# Patient Record
Sex: MALE | Race: WHITE | NOT HISPANIC OR LATINO | Employment: FULL TIME | RURAL
[De-identification: names, ages, dates, MRNs, and addresses within clinical notes are randomized per-mention and may not be internally consistent; named-entity substitution may affect disease eponyms.]

---

## 2022-10-26 DIAGNOSIS — L98.9 SKIN LESION: Primary | ICD-10-CM

## 2022-11-08 ENCOUNTER — OFFICE VISIT (OUTPATIENT)
Dept: DERMATOLOGY | Facility: CLINIC | Age: 62
End: 2022-11-08
Payer: COMMERCIAL

## 2022-11-08 VITALS — HEIGHT: 73 IN | RESPIRATION RATE: 18 BRPM

## 2022-11-08 DIAGNOSIS — D48.9 NEOPLASM OF UNCERTAIN BEHAVIOR: Primary | ICD-10-CM

## 2022-11-08 DIAGNOSIS — Z85.828 HISTORY OF SKIN CANCER: ICD-10-CM

## 2022-11-08 DIAGNOSIS — L57.0 ACTINIC KERATOSES: ICD-10-CM

## 2022-11-08 PROCEDURE — 17000 PR DESTRUCTION(LASER SURGERY,CRYOSURGERY,CHEMOSURGERY),PREMALIGNANT LESIONS,FIRST LESION: ICD-10-PCS | Mod: XS,ICN,, | Performed by: STUDENT IN AN ORGANIZED HEALTH CARE EDUCATION/TRAINING PROGRAM

## 2022-11-08 PROCEDURE — 17003 DESTRUCT PREMALG LES 2-14: CPT | Mod: ICN,,, | Performed by: STUDENT IN AN ORGANIZED HEALTH CARE EDUCATION/TRAINING PROGRAM

## 2022-11-08 PROCEDURE — 17003 DESTRUCTION, PREMALIGNANT LESIONS; SECOND THROUGH 14 LESIONS: ICD-10-PCS | Mod: ICN,,, | Performed by: STUDENT IN AN ORGANIZED HEALTH CARE EDUCATION/TRAINING PROGRAM

## 2022-11-08 PROCEDURE — 40490 PR BIOPSY OF LIP: ICD-10-PCS | Mod: XS,ICN,, | Performed by: STUDENT IN AN ORGANIZED HEALTH CARE EDUCATION/TRAINING PROGRAM

## 2022-11-08 PROCEDURE — 17000 DESTRUCT PREMALG LESION: CPT | Mod: XS,ICN,, | Performed by: STUDENT IN AN ORGANIZED HEALTH CARE EDUCATION/TRAINING PROGRAM

## 2022-11-08 PROCEDURE — 1159F PR MEDICATION LIST DOCUMENTED IN MEDICAL RECORD: ICD-10-PCS | Mod: ICN,,, | Performed by: STUDENT IN AN ORGANIZED HEALTH CARE EDUCATION/TRAINING PROGRAM

## 2022-11-08 PROCEDURE — 88305 TISSUE EXAM BY PATHOLOGIST: CPT | Mod: 26,,, | Performed by: PATHOLOGY

## 2022-11-08 PROCEDURE — 99203 OFFICE O/P NEW LOW 30 MIN: CPT | Mod: 25,ICN,, | Performed by: STUDENT IN AN ORGANIZED HEALTH CARE EDUCATION/TRAINING PROGRAM

## 2022-11-08 PROCEDURE — 11103 PR TANGENTIAL BIOPSY, SKIN, EA ADDTL LESION: ICD-10-PCS | Mod: ICN,,, | Performed by: STUDENT IN AN ORGANIZED HEALTH CARE EDUCATION/TRAINING PROGRAM

## 2022-11-08 PROCEDURE — 88305 TISSUE EXAM BY PATHOLOGIST: CPT | Mod: SUR | Performed by: STUDENT IN AN ORGANIZED HEALTH CARE EDUCATION/TRAINING PROGRAM

## 2022-11-08 PROCEDURE — 11102 PR TANGENTIAL BIOPSY, SKIN, SINGLE LESION: ICD-10-PCS | Mod: XS,ICN,, | Performed by: STUDENT IN AN ORGANIZED HEALTH CARE EDUCATION/TRAINING PROGRAM

## 2022-11-08 PROCEDURE — 40490 BIOPSY OF LIP: CPT | Mod: XS,ICN,, | Performed by: STUDENT IN AN ORGANIZED HEALTH CARE EDUCATION/TRAINING PROGRAM

## 2022-11-08 PROCEDURE — 99203 PR OFFICE/OUTPT VISIT, NEW, LEVL III, 30-44 MIN: ICD-10-PCS | Mod: 25,ICN,, | Performed by: STUDENT IN AN ORGANIZED HEALTH CARE EDUCATION/TRAINING PROGRAM

## 2022-11-08 PROCEDURE — 11102 TANGNTL BX SKIN SINGLE LES: CPT | Mod: XS,ICN,, | Performed by: STUDENT IN AN ORGANIZED HEALTH CARE EDUCATION/TRAINING PROGRAM

## 2022-11-08 PROCEDURE — 1159F MED LIST DOCD IN RCRD: CPT | Mod: ICN,,, | Performed by: STUDENT IN AN ORGANIZED HEALTH CARE EDUCATION/TRAINING PROGRAM

## 2022-11-08 PROCEDURE — 11103 TANGNTL BX SKIN EA SEP/ADDL: CPT | Mod: ICN,,, | Performed by: STUDENT IN AN ORGANIZED HEALTH CARE EDUCATION/TRAINING PROGRAM

## 2022-11-08 PROCEDURE — 88305 PATHOLOGY, DERMATOLOGY: ICD-10-PCS | Mod: 26,,, | Performed by: PATHOLOGY

## 2022-11-08 RX ORDER — POTASSIUM CHLORIDE 20 MEQ/1
20 TABLET, EXTENDED RELEASE ORAL DAILY
COMMUNITY
Start: 2022-09-29

## 2022-11-08 RX ORDER — PANTOPRAZOLE SODIUM 40 MG/1
40 TABLET, DELAYED RELEASE ORAL DAILY
COMMUNITY
Start: 2022-11-02

## 2022-11-08 RX ORDER — RANOLAZINE 1000 MG/1
1000 TABLET, EXTENDED RELEASE ORAL 2 TIMES DAILY
COMMUNITY
Start: 2022-05-27

## 2022-11-08 RX ORDER — AMOXICILLIN 500 MG
CAPSULE ORAL DAILY
COMMUNITY

## 2022-11-08 RX ORDER — ASPIRIN 81 MG/1
81 TABLET ORAL DAILY
COMMUNITY

## 2022-11-08 RX ORDER — ISOSORBIDE MONONITRATE 30 MG/1
30 TABLET, EXTENDED RELEASE ORAL DAILY
COMMUNITY
Start: 2022-08-03

## 2022-11-08 RX ORDER — CLOPIDOGREL BISULFATE 75 MG/1
75 TABLET ORAL DAILY
COMMUNITY
Start: 2022-09-12

## 2022-11-08 RX ORDER — METOPROLOL TARTRATE 25 MG/1
25 TABLET, FILM COATED ORAL DAILY
COMMUNITY
Start: 2022-11-02

## 2022-11-08 RX ORDER — EZETIMIBE 10 MG/1
10 TABLET ORAL DAILY
COMMUNITY
Start: 2022-10-18

## 2022-11-08 NOTE — PROGRESS NOTES
[facility]  Ghanshyam Egan MD    [address] [address]  [phone]    Fax: [phone]    Patient Name: Robert Serrano  Medical Record Number: [id number]  PCP: Nic Navarro MD  Age: 62 y.o. : 1960  Contact: [phone] (home)     CC: lesion on lip  History of Present Illness:     Robert Serrano is a 62 y.o.  male with history of skin cancer (treated by Dr. Garcia on arms and face)  who presents to clinic today for lesion on lip.  This has been present for 5 months. Symptoms include growth. Previous treatments include C-5 FU/ Lidocaine lip BA 1/1% and carmex. Other concerns today are lesion on left arm.      The patient has no other concerns today.    Review of Systems:     Unremarkable other than mentioned above.     Physical Exam:     General: Relaxed, oriented, alert    Skin examination of the scalp, face, neck, chest, back, abdomen, upper extremities and lower extremities were normal except for as listed below          Assessment and Plan:     1. Actinic Keratoses  Erythematous, scaly papules on right ear, right arm     Plan: Liquid Nitrogen.  A total of 3 lesions were treated with liquid nitrogen for 2 freeze-thaw cycles lasting 5 seconds, located on the above locations.   The patient's consent was obtained including but not limited to risks of crusting, scabbing,  blistering, scarring, darker or lighter pigmentary change, recurrence, incomplete removal and infection.    Counseling.  Sun protective clothing and broad spectrum sunscreen can prevent the formation of AK.   AKs can be treated with cryotherapy, photodynamic therapy, imiquimod, topical 5-FU.  Actinic Keratoses are precancerous proliferations that occur within sun damaged skin. If untreated,  a small subset of AKs can develop into Squamous Cell Carcinoma.       2. Neoplasm of Uncertain Behavior x 3     A) scaly bleeding plaque located on the left arm  Ddx includes: BCC  vs SCC    B) ulcerated plaque located on the lower lip  Ddx includes: SCC    C) scaly plaque located on left temple  Ddx includes: SCC vs AK      Shave biopsy  x 3    Pre-procedure Diagnosis: as above  Post_procedure Diagnosis: same  Estimated Blood Loss: <1cc    Findings: None  Complications: None  Specimens: to pathology      Written informed consent was obtained after discussing risks including pain, bleeding, infection, recurrence and scarring. The biopsy site was sterilely prepped with alcohol, which was allowed to dry completely, then locally infiltrated with 1% lidocaine with epinephrine, ~3 cc total. A shave biopsy was obtained using a Dermablade/15 and the specimen was sent to dermatopathology. Aluminum chloride was used for hemostasis. Antibiotic ointment and a clean dressing were applied. The patient tolerated the procedure well without complications. Verbal and written wound care instructions were given.    Ghanshyam Egan MD       Return to clinic in 6 months    AVS printed with patient instructions     Ghanshyam Egan MD   Mohs Surgery/Dermatologic Oncology  Dermatology

## 2022-11-08 NOTE — PATIENT INSTRUCTIONS
"Biopsy Site Care  Starting tomorrow you may shower and wash the area with antibacterial soap  Pat dry and apply vaseline and a bandaid  The area will be irritated, sore, slightly red, and may itch, sting, or burn  Do not apply make-up to the area until it is healed  There will be a scar anytime we cut the skin to the level of the dermis, which occurs in a biopsy   The area will take 1-2 weeks to heal  No soaking in the tub or hot tub for one week    Liquid Nitrogen Wound Care     - the areas treated with liquid nitrogen may form sores, blisters, and scabs. This is normal   - if a blister forms, please keep it intact and do not rupture it. It will resolve within a few days   - please keep petrolatum ointment to the area once to twice daily. You can cover with a bandage if you wish, but it is usually not necessary   - the area may be painful for a few days. We recommend ice, or over the counter tylenol or NSAIDs (such as ibuprofen or naproxen, if you are able to take these)   - this may result in a scar or a "hypopigmented" or lighter area of skin. This may or may not resolve with time   - please call our clinic if the lesion does not resolve, as this can be treated again     "

## 2022-11-10 LAB
ESTROGEN SERPL-MCNC: NORMAL PG/ML
INSULIN SERPL-ACNC: NORMAL U[IU]/ML
LAB AP GROSS DESCRIPTION: NORMAL
LAB AP LABORATORY NOTES: NORMAL
LAB AP SPEC A DDX: NORMAL
LAB AP SPEC A MORPHOLOGY: NORMAL
LAB AP SPEC A PROCEDURE: NORMAL
LAB AP SPEC B DDX: NORMAL
LAB AP SPEC B MORPHOLOGY: NORMAL
LAB AP SPEC B PROCEDURE: NORMAL
LAB AP SPEC C DDX: NORMAL
LAB AP SPEC C MORPHOLOGY: NORMAL
LAB AP SPEC C PROCEDURE: NORMAL
T3RU NFR SERPL: NORMAL %

## 2022-12-14 NOTE — PATIENT INSTRUCTIONS

## 2022-12-15 ENCOUNTER — PROCEDURE VISIT (OUTPATIENT)
Dept: DERMATOLOGY | Facility: CLINIC | Age: 62
End: 2022-12-15
Payer: COMMERCIAL

## 2022-12-15 VITALS — HEART RATE: 70 BPM | SYSTOLIC BLOOD PRESSURE: 130 MMHG | DIASTOLIC BLOOD PRESSURE: 72 MMHG

## 2022-12-15 DIAGNOSIS — D04.62 SQUAMOUS CELL CARCINOMA IN SITU (SCCIS) OF SKIN OF LEFT UPPER ARM: Primary | ICD-10-CM

## 2022-12-15 PROCEDURE — 88305 TISSUE EXAM BY PATHOLOGIST: CPT | Mod: TC,SUR | Performed by: STUDENT IN AN ORGANIZED HEALTH CARE EDUCATION/TRAINING PROGRAM

## 2022-12-15 PROCEDURE — 11604 EXC TR-EXT MAL+MARG 3.1-4 CM: CPT | Mod: ,,, | Performed by: STUDENT IN AN ORGANIZED HEALTH CARE EDUCATION/TRAINING PROGRAM

## 2022-12-15 PROCEDURE — 88305 PATHOLOGY, DERMATOLOGY: ICD-10-PCS | Mod: 26,,, | Performed by: PATHOLOGY

## 2022-12-15 PROCEDURE — 99499 UNLISTED E&M SERVICE: CPT | Mod: ,,, | Performed by: STUDENT IN AN ORGANIZED HEALTH CARE EDUCATION/TRAINING PROGRAM

## 2022-12-15 PROCEDURE — 12034 PR LAYR CLOS WND TRUNK,ARM,LEG 7.6-12.5 CM: ICD-10-PCS | Mod: 51,,, | Performed by: STUDENT IN AN ORGANIZED HEALTH CARE EDUCATION/TRAINING PROGRAM

## 2022-12-15 PROCEDURE — 11604 PR EXC SKIN MALIG 3.1-4 CM TRUNK,ARM,LEG: ICD-10-PCS | Mod: ,,, | Performed by: STUDENT IN AN ORGANIZED HEALTH CARE EDUCATION/TRAINING PROGRAM

## 2022-12-15 PROCEDURE — 99499 NO LOS: ICD-10-PCS | Mod: ,,, | Performed by: STUDENT IN AN ORGANIZED HEALTH CARE EDUCATION/TRAINING PROGRAM

## 2022-12-15 PROCEDURE — 88305 TISSUE EXAM BY PATHOLOGIST: CPT | Mod: 26,,, | Performed by: PATHOLOGY

## 2022-12-15 PROCEDURE — 12034 INTMD RPR S/TR/EXT 7.6-12.5: CPT | Mod: 51,,, | Performed by: STUDENT IN AN ORGANIZED HEALTH CARE EDUCATION/TRAINING PROGRAM

## 2022-12-15 RX ORDER — MUPIROCIN 20 MG/G
OINTMENT TOPICAL 2 TIMES DAILY
Qty: 22 G | Refills: 5 | Status: SHIPPED | OUTPATIENT
Start: 2022-12-15

## 2022-12-15 NOTE — PROGRESS NOTES
[facility]    Ghanshyam Egan MD    Elliptical Excision with Intermediate Closure    Tumor Type: SCCIS  Location:  left arm  Derm-Path Accession #:  [id number]  Lesion Size:  3.1 x 2.1 cm   Surgical Margins: 0.5 cm   Post op size: 4.1 x 3.1 cm  Level of Defect:  fat  Repair Type:  Intermediate linear   Repair Length:  8 cm   Sutures: 4-0 prolene, 3-0 PDS    Primary Surgeon: CURRY Egan MD      INDICATIONS:  The risks of bleeding, infection, discomfort, incomplete removal, and scar formation were explained to the patient.  All questions were answered.  After informed consent, confirmation of site and identity, and appropriate instructions, the patient underwent the procedure as follows:    PROCEDURE:  With the patient in a supine position, the lesion was outlined with the above margins. An ellipse was designed around the lesion to conform to relaxed skin tension lines in an effort to minimize scarring and deformity.  The patient was then placed in a supine position.  The lesion and surrounding skin were prepped with chlorhexidine, draped, and anesthetized with 1% lidocaine with epinephrine 1:100,100 buffered with 1:10 sodium bicarbonate.  Using a #15 blade, the skin was excised along premarked lines.  The resulting defect extended through deep subcutaneous tissue.  Wound margins were undermined to limit functional deformity/impairment of adjacent structures.  Bleeding vessels were controlled with  monopolar  electrodessication .  The dermis and subcutaneous tissue were closed with buried vertical mattress sutures.  Epidermal approximation was meticulously refined with simple running sutures, resulting in a linear closure with little to no wound tension.  Blood loss was estimated to be less than 5cc.  The area was coated with petrolatum and covered with a non-adherent dressing followed by gauze and tape.  Postoperative instructions were reviewed per protocol.  The patient left alert and fully  oriented.              Ghanshyam Egan MD

## 2022-12-15 NOTE — PROGRESS NOTES
Excision Consult Note    Robert Serrano is a 62 y.o. male who is referred by Dr. Egan for evaluation of a SCCIS on the left arm.     Recurrent skin cancer: No    Preoperative Risk Factors:  Current Anticoagulants: Yes asa 81 mg plavix 75 mg   Endocarditis / Rheumatic Fever hx: No  Immunocompromised: No  Prosthetic joint: No  Congenital heart defect: No  Prosthetic heart valve: No  Diabetic: No  Transplant: No  Pacemaker: No  Defibrillator:  No  Prior problem with local anesthesia: No  Tobacco History: No]  Clindamycin Allergy: No  Pregnant: no     Transmissible Diseases:  HIV No  Hepatitis B or C  No      Exam:  Limited skin exam is normal except for a SCCIS  located on the left arm  .    Pathologic Findings:  Accession # [id number]  Diagnosis: SCCIS    Assessment and Plan:  Treatment Options : Given the indications and high cure rate, the patient has agreed to proceed with excision  Risks and Benefits : The rationale for excision was explained to the patient. The risks and benefits to therapy were discussed in detail. Specifically, the risks of infection, scarring, bleeding, dehiscence, hematoma, prolonged wound healing, incomplete removal, allergy to anesthesia, nerve injury, inability to clear the lesion and recurrence were addressed. The treatment site was clearly identified and confirmed by the patient.    Plan:  Excision    Ghanshyam Egan MD   Mohs Surgery/Dermatologic Oncology

## 2022-12-19 LAB
ESTROGEN SERPL-MCNC: NORMAL PG/ML
INSULIN SERPL-ACNC: NORMAL U[IU]/ML
LAB AP GROSS DESCRIPTION: NORMAL
LAB AP LABORATORY NOTES: NORMAL
LAB AP SPEC A DDX: NORMAL
LAB AP SPEC A MORPHOLOGY: NORMAL
LAB AP SPEC A PROCEDURE: NORMAL
T3RU NFR SERPL: NORMAL %

## 2022-12-27 ENCOUNTER — CLINICAL SUPPORT (OUTPATIENT)
Dept: DERMATOLOGY | Facility: CLINIC | Age: 62
End: 2022-12-27
Payer: COMMERCIAL

## 2022-12-27 DIAGNOSIS — Z85.828 HISTORY OF SKIN CANCER: ICD-10-CM

## 2022-12-27 DIAGNOSIS — L08.9 SKIN INFECTION: ICD-10-CM

## 2022-12-27 DIAGNOSIS — L57.0 ACTINIC KERATOSES: Primary | ICD-10-CM

## 2022-12-27 DIAGNOSIS — B00.1 COLD SORE: ICD-10-CM

## 2022-12-27 PROCEDURE — 87077 CULTURE AEROBIC IDENTIFY: CPT | Mod: ,,, | Performed by: CLINICAL MEDICAL LABORATORY

## 2022-12-27 PROCEDURE — 17000 DESTRUCT PREMALG LESION: CPT | Mod: ,,, | Performed by: STUDENT IN AN ORGANIZED HEALTH CARE EDUCATION/TRAINING PROGRAM

## 2022-12-27 PROCEDURE — 87077 CULTURE, WOUND: ICD-10-PCS | Mod: ,,, | Performed by: CLINICAL MEDICAL LABORATORY

## 2022-12-27 PROCEDURE — 87070 CULTURE OTHR SPECIMN AEROBIC: CPT | Mod: ,,, | Performed by: CLINICAL MEDICAL LABORATORY

## 2022-12-27 PROCEDURE — 17000 PR DESTRUCTION(LASER SURGERY,CRYOSURGERY,CHEMOSURGERY),PREMALIGNANT LESIONS,FIRST LESION: ICD-10-PCS | Mod: ,,, | Performed by: STUDENT IN AN ORGANIZED HEALTH CARE EDUCATION/TRAINING PROGRAM

## 2022-12-27 PROCEDURE — 99214 PR OFFICE/OUTPT VISIT, EST, LEVL IV, 30-39 MIN: ICD-10-PCS | Mod: 25,,, | Performed by: STUDENT IN AN ORGANIZED HEALTH CARE EDUCATION/TRAINING PROGRAM

## 2022-12-27 PROCEDURE — 87070 CULTURE, WOUND: ICD-10-PCS | Mod: ,,, | Performed by: CLINICAL MEDICAL LABORATORY

## 2022-12-27 PROCEDURE — 99214 OFFICE O/P EST MOD 30 MIN: CPT | Mod: 25,,, | Performed by: STUDENT IN AN ORGANIZED HEALTH CARE EDUCATION/TRAINING PROGRAM

## 2022-12-27 PROCEDURE — 87186 SC STD MICRODIL/AGAR DIL: CPT | Mod: ,,, | Performed by: CLINICAL MEDICAL LABORATORY

## 2022-12-27 PROCEDURE — 87186 CULTURE, WOUND: ICD-10-PCS | Mod: ,,, | Performed by: CLINICAL MEDICAL LABORATORY

## 2022-12-27 RX ORDER — VALACYCLOVIR HYDROCHLORIDE 1 G/1
2000 TABLET, FILM COATED ORAL 2 TIMES DAILY
Qty: 4 TABLET | Refills: 2 | Status: SHIPPED | OUTPATIENT
Start: 2022-12-27 | End: 2022-12-28

## 2022-12-27 RX ORDER — DOXYCYCLINE 100 MG/1
100 CAPSULE ORAL 2 TIMES DAILY
Qty: 14 CAPSULE | Refills: 0 | Status: CANCELLED | OUTPATIENT
Start: 2022-12-27 | End: 2023-01-03

## 2022-12-27 RX ORDER — VALACYCLOVIR HYDROCHLORIDE 500 MG/1
500 TABLET, FILM COATED ORAL DAILY
Qty: 90 TABLET | Refills: 11 | Status: SHIPPED | OUTPATIENT
Start: 2022-12-27 | End: 2023-12-27

## 2022-12-27 NOTE — PROGRESS NOTES
Elliptical Excision with Intermediate Closure     Tumor Type: SCCIS  Location:  left arm  Derm-Path Accession #:  [id number]  Lesion Size:  3.1 x 2.1 cm   Surgical Margins: 0.5 cm   Post op size: 4.1 x 3.1 cm  Level of Defect:  fat  Repair Type:  Intermediate linear   Repair Length:  8 cm   Sutures: 4-0 prolene, 3-0 PDS     Primary Surgeon: CURRY Egan MD    Sutures removed. Incision draining slightly, culture obtained. Patient did not apply the mupirocin he had to the wound as directed     He also reports recurrent cold sores, several a month. He also has a tender growing lesion on L forearm

## 2022-12-27 NOTE — PROGRESS NOTES
[facility]  Ghanshyam Egan MD    [address] [place] [address]  [phone]    Fax: [phone]    Patient Name: Robert Serrano  Medical Record Number: [id number]  PCP: Nic Navarro MD  Age: 62 y.o. : 1960  Contact: [phone] (home)     History of Present Illness:     Robert Serrano is a 62 y.o.  male here for follow up of SCC of L forearm s/p excision. He also reports recurrent cold sores, several a month. He also has a tender growing lesion on L forearm     The patient has no other concerns today.    Review of Systems:     Unremarkable other than mentioned above.     Physical Exam:     General: Relaxed, oriented, alert    Skin examination of the scalp, face, neck, chest, back, abdomen, upper extremities and lower extremities were normal except for as listed below      Assessment and Plan:     Recurrent cold sores (herpes labialis)  - erosions on vermillion border     - Valtrex 2 g q12h x 1 day PRN at first sign of cold sore  - will do valtrex 500 mg daily for suppressive therapty    2. Actinic Keratoses  Erythematous, scaly papules on L forearm    Plan: Liquid Nitrogen.  A total of 1 lesions were treated with liquid nitrogen for 2 freeze-thaw cycles lasting 5 seconds, located on the above locations.   The patient's consent was obtained including but not limited to risks of crusting, scabbing,  blistering, scarring, darker or lighter pigmentary change, recurrence, incomplete removal and infection.    Counseling.  Sun protective clothing and broad spectrum sunscreen can prevent the formation of AK.   AKs can be treated with cryotherapy, photodynamic therapy, imiquimod, topical 5-FU.  Actinic Keratoses are precancerous proliferations that occur within sun damaged skin. If untreated,  a small subset of AKs can develop into Squamous Cell Carcinoma.    3. History of NMSC   Well-healingscar on L forearm   No e/o recurrence   Recommend sunscreen  and good photoprotection     Ghanshyam Egan MD   Mohs Surgery/Dermatologic Oncology    Return to clinic in May 2023     AVS printed with patient instructions     Ghanshyam Egan MD   Mohs Surgery/Dermatologic Oncology  Dermatology

## 2022-12-29 LAB — MICROORGANISM SPEC CULT: ABNORMAL

## 2023-05-09 ENCOUNTER — OFFICE VISIT (OUTPATIENT)
Dept: DERMATOLOGY | Facility: CLINIC | Age: 63
End: 2023-05-09
Payer: COMMERCIAL

## 2023-05-09 DIAGNOSIS — Z85.828 HISTORY OF NONMELANOMA SKIN CANCER: ICD-10-CM

## 2023-05-09 DIAGNOSIS — D48.9 NEOPLASM OF UNCERTAIN BEHAVIOR: Primary | ICD-10-CM

## 2023-05-09 DIAGNOSIS — L57.0 ACTINIC KERATOSES: ICD-10-CM

## 2023-05-09 PROCEDURE — 17000 PR DESTRUCTION(LASER SURGERY,CRYOSURGERY,CHEMOSURGERY),PREMALIGNANT LESIONS,FIRST LESION: ICD-10-PCS | Mod: ,,, | Performed by: STUDENT IN AN ORGANIZED HEALTH CARE EDUCATION/TRAINING PROGRAM

## 2023-05-09 PROCEDURE — 88342 IMHCHEM/IMCYTCHM 1ST ANTB: CPT | Mod: 26,,, | Performed by: PATHOLOGY

## 2023-05-09 PROCEDURE — 17003 DESTRUCT PREMALG LES 2-14: CPT | Mod: ,,, | Performed by: STUDENT IN AN ORGANIZED HEALTH CARE EDUCATION/TRAINING PROGRAM

## 2023-05-09 PROCEDURE — 88312 PATHOLOGY, DERMATOLOGY: ICD-10-PCS | Mod: 26,,, | Performed by: PATHOLOGY

## 2023-05-09 PROCEDURE — 40490 BIOPSY OF LIP: CPT | Mod: XS,,, | Performed by: STUDENT IN AN ORGANIZED HEALTH CARE EDUCATION/TRAINING PROGRAM

## 2023-05-09 PROCEDURE — 17000 DESTRUCT PREMALG LESION: CPT | Mod: ,,, | Performed by: STUDENT IN AN ORGANIZED HEALTH CARE EDUCATION/TRAINING PROGRAM

## 2023-05-09 PROCEDURE — 88312 SPECIAL STAINS GROUP 1: CPT | Mod: 26,,, | Performed by: PATHOLOGY

## 2023-05-09 PROCEDURE — 88305 TISSUE EXAM BY PATHOLOGIST: CPT | Mod: 26,,, | Performed by: PATHOLOGY

## 2023-05-09 PROCEDURE — 99213 PR OFFICE/OUTPT VISIT, EST, LEVL III, 20-29 MIN: ICD-10-PCS | Mod: 25,,, | Performed by: STUDENT IN AN ORGANIZED HEALTH CARE EDUCATION/TRAINING PROGRAM

## 2023-05-09 PROCEDURE — 40490 PR BIOPSY OF LIP: ICD-10-PCS | Mod: XS,,, | Performed by: STUDENT IN AN ORGANIZED HEALTH CARE EDUCATION/TRAINING PROGRAM

## 2023-05-09 PROCEDURE — 88305 TISSUE EXAM BY PATHOLOGIST: CPT | Mod: TC,SUR | Performed by: STUDENT IN AN ORGANIZED HEALTH CARE EDUCATION/TRAINING PROGRAM

## 2023-05-09 PROCEDURE — 88305 PATHOLOGY, DERMATOLOGY: ICD-10-PCS | Mod: 26,,, | Performed by: PATHOLOGY

## 2023-05-09 PROCEDURE — 99213 OFFICE O/P EST LOW 20 MIN: CPT | Mod: 25,,, | Performed by: STUDENT IN AN ORGANIZED HEALTH CARE EDUCATION/TRAINING PROGRAM

## 2023-05-09 PROCEDURE — 88342 IMHCHEM/IMCYTCHM 1ST ANTB: CPT

## 2023-05-09 PROCEDURE — 17003 DESTRUCTION, PREMALIGNANT LESIONS; SECOND THROUGH 14 LESIONS: ICD-10-PCS | Mod: ,,, | Performed by: STUDENT IN AN ORGANIZED HEALTH CARE EDUCATION/TRAINING PROGRAM

## 2023-05-09 PROCEDURE — 88342 PATHOLOGY, DERMATOLOGY: ICD-10-PCS | Mod: 26,,, | Performed by: PATHOLOGY

## 2023-05-09 NOTE — PROGRESS NOTES
[facility]  Ghanshyam Egan MD    [facility] [address] [place] [address]  [phone]    Fax: [phone]    Patient Name: Robert Serrano  Medical Record Number: [id number]  PCP: Nic Navarro MD  Age: 63 y.o. : 1960  Contact: [phone] (home)     History of Present Illness:     Robert Serrano is a 63 y.o.  male here for follow up of SCCIS of L arm. S/P excision with clear margins on 2022. The scar has healed well. Today, he is here for routine skin exam givne hx of NMSC    The patient has no other concerns today.    Review of Systems:     Unremarkable other than mentioned above.     Physical Exam:     General: Relaxed, oriented, alert    Skin examination of the scalp, face, neck, chest, back, abdomen, upper extremities and lower extremities were normal except for as listed below      Assessment and Plan:     1. History of NMSC   Well-healed scar on L arm.   No e/o recurrence   Recommend sunscreen and good photoprotection       2. ***      Return to clinic in ***    AVS printed with patient instructions     Ghanshyam Egan MD   Mohs Surgery/Dermatologic Oncology  Dermatology

## 2023-05-09 NOTE — PROGRESS NOTES
[facility]  Ghanshyam Egan MD    [address] [place] [address]  [phone]    Fax: [phone]    Patient Name: Robert Serrano  Medical Record Number: [id number]  PCP: Nic Navarro MD  Age: 63 y.o. : 1960  Contact: [phone] (home)     History of Present Illness:     Robert Serrano is a 63 y.o.  male here for follow up of SCC of L forearm s/p excision, recurrent cold sores and an actinic keratosis on the left forearm. He has a non-healing ulcer on his lip adjacent to previously biopsied AK.     Review of Systems:     Unremarkable other than mentioned above.     Physical Exam:     General: Relaxed, oriented, alert    Skin examination of the scalp, face, neck, chest, back, abdomen, upper extremities and lower extremities were normal except for as listed below            Assessment and Plan:     1. Neoplasm of Uncertain Behavior x 2     A) hyperkeratotic plaque located on the left elbow  Ddx includes: SCC    B) eroded papule located on the left lower vermilion lip   Ddx includes: SCC      Shave biopsy      Pre-procedure Diagnosis: as above  Post_procedure Diagnosis: same  Estimated Blood Loss: <1cc    Findings: None  Complications: None  Specimens: to pathology      Written informed consent was obtained after discussing risks including pain, bleeding, infection, recurrence and scarring. The biopsy site was sterilely prepped with alcohol, which was allowed to dry completely, then locally infiltrated with 1% lidocaine with epinephrine, ~3 cc total. A shave biopsy was obtained using a Dermablade/15 and the specimen was sent to dermatopathology. Aluminum chloride was used for hemostasis. Antibiotic ointment and a clean dressing were applied. The patient tolerated the procedure well without complications. Verbal and written wound care instructions were given.    Ghanshyam Egan MD         2. Actinic Keratoses  Erythematous, scaly papules on L forearm,  left ear     Plan: Liquid Nitrogen.  A total of 2 lesions were treated with liquid nitrogen for 2 freeze-thaw cycles lasting 5 seconds, located on the above locations.   The patient's consent was obtained including but not limited to risks of crusting, scabbing,  blistering, scarring, darker or lighter pigmentary change, recurrence, incomplete removal and infection.    Counseling.  Sun protective clothing and broad spectrum sunscreen can prevent the formation of AK.   AKs can be treated with cryotherapy, photodynamic therapy, imiquimod, topical 5-FU.  Actinic Keratoses are precancerous proliferations that occur within sun damaged skin. If untreated,  a small subset of AKs can develop into Squamous Cell Carcinoma.    3. History of NMSC   Well-healingscar on L forearm   No e/o recurrence   Recommend sunscreen and good photoprotection     Ghanshyam Egan MD   Mohs Surgery/Dermatologic Oncology    Return to clinic in 6 months    AVS printed with patient instructions     Ghanshyam Egan MD   Mohs Surgery/Dermatologic Oncology  Dermatology

## 2023-05-18 LAB
DHEA SERPL-MCNC: NORMAL
ESTRIOL SERPL-MCNC: NORMAL NG/ML
ESTROGEN SERPL-MCNC: NORMAL PG/ML
INSULIN SERPL-ACNC: NORMAL U[IU]/ML
LAB AP GROSS DESCRIPTION: NORMAL
LAB AP LABORATORY NOTES: NORMAL
LAB AP SPEC A DDX: NORMAL
LAB AP SPEC A MORPHOLOGY: NORMAL
LAB AP SPEC A PROCEDURE: NORMAL
LAB AP SPEC B DDX: NORMAL
LAB AP SPEC B MORPHOLOGY: NORMAL
LAB AP SPEC B PROCEDURE: NORMAL
T3RU NFR SERPL: NORMAL %

## 2024-09-19 ENCOUNTER — OFFICE VISIT (OUTPATIENT)
Dept: DERMATOLOGY | Facility: CLINIC | Age: 64
End: 2024-09-19
Payer: COMMERCIAL

## 2024-09-19 DIAGNOSIS — L57.0 ACTINIC KERATOSES: ICD-10-CM

## 2024-09-19 DIAGNOSIS — B07.9 VERRUCA VULGARIS: ICD-10-CM

## 2024-09-19 DIAGNOSIS — Z85.828 HISTORY OF NONMELANOMA SKIN CANCER: ICD-10-CM

## 2024-09-19 DIAGNOSIS — D48.9 NEOPLASM OF UNCERTAIN BEHAVIOR: Primary | ICD-10-CM

## 2024-09-19 PROCEDURE — 88305 TISSUE EXAM BY PATHOLOGIST: CPT | Mod: TC,91,SUR | Performed by: STUDENT IN AN ORGANIZED HEALTH CARE EDUCATION/TRAINING PROGRAM

## 2024-09-19 PROCEDURE — 88305 TISSUE EXAM BY PATHOLOGIST: CPT | Mod: 26,,, | Performed by: PATHOLOGY

## 2024-09-19 NOTE — PROGRESS NOTES
[facility]  Ghanshyam Egan MD    [address] [address]  [phone]    Fax: [phone]    Patient Name: Robert Serrano  Medical Record Number: [id number]  PCP: Nic Navarro MD  Age: 64 y.o. : 1960  Contact: [phone] (home)     History of Present Illness:     Robert Serrano is a 64 y.o.  male here for follow up of history of NMSC (SCC of L forearm s/p excision). Patient is concerned with lesion on the lip that was previously biopsied as AK and lesion on the ear.     The patient has no other concerns today.    Review of Systems:     Unremarkable other than mentioned above.     Physical Exam:     General: Relaxed, oriented, alert    Skin examination of the scalp, face, neck, chest, back, abdomen, upper extremities and lower extremities were normal except for as listed below      Assessment and Plan:     1. History of NMSC   Well-healed scar on L forearm  No e/o recurrence   Recommend sunscreen and good photoprotection       2. Actinic Keratoses  Erythematous, scaly papules on lip, L ear, right arm      Plan: Liquid Nitrogen.  A total of 4 lesions were treated with liquid nitrogen for 2 freeze-thaw cycles lasting 5 seconds, located on the above locations.   The patient's consent was obtained including but not limited to risks of crusting, scabbing,  blistering, scarring, darker or lighter pigmentary change, recurrence, incomplete removal and infection.    Counseling.  Sun protective clothing and broad spectrum sunscreen can prevent the formation of AK.   AKs can be treated with cryotherapy, photodynamic therapy, imiquimod, topical 5-FU.  Actinic Keratoses are precancerous proliferations that occur within sun damaged skin. If untreated,  a small subset of AKs can develop into Squamous Cell Carcinoma.    3. Verruca Vulgaris  - verrucous papules with thrombosed capillary loops located on the lip    Plan:    Liquid Nitrogen  A total of 2  lesion(s) was treated with liquid nitrogen, located on the above listed location.  This procedure was medically necessary because the lesions that were treated were: enlarging, inflamed, and contagious. The patient's consent was obtained including but not limited to risks of crusting, scabbing, blistering, scarring, darker or lighter pigmentary change, recurrence, incomplete removal and infection.    Counseling  Viral nature was discussed, and the risk of the warts spreading   Verruca Vulgaris can be treated with retinoids, aldara, salicylic acid preparations, cryotherapy, candida antigen, or cantharidin  HPV vaccination is recommended   Over the counter Wart Stick can be applied daily as an adjunct therapy (wait until blister heals from cryotherapy)     4. Neoplasm of Uncertain Behavior x 2     A) scaly plaque located on the R superior helix  Ddx includes: SCC    B) scaly plaque located on the R infra auricular cheek   Ddx includes: SCC vs ISK    Shave biopsy  x 2     Pre-procedure Diagnosis: as above  Post_procedure Diagnosis: same  Estimated Blood Loss: <1cc    Findings: None  Complications: None  Specimens: to pathology      Written informed consent was obtained after discussing risks including pain, bleeding, infection, recurrence and scarring. The biopsy site was sterilely prepped with alcohol, which was allowed to dry completely, then locally infiltrated with 1% lidocaine with epinephrine, ~3 cc total. A shave biopsy was obtained using a Dermablade/15 and the specimen was sent to dermatopathology. Aluminum chloride was used for hemostasis. Antibiotic ointment and a clean dressing were applied. The patient tolerated the procedure well without complications. Verbal and written wound care instructions were given.    MD Ghanshyam Young MD   Mohs Surgery/Dermatologic Oncology  Dermatology

## 2024-10-09 ENCOUNTER — PROCEDURE VISIT (OUTPATIENT)
Dept: DERMATOLOGY | Facility: CLINIC | Age: 64
End: 2024-10-09
Payer: COMMERCIAL

## 2024-10-09 VITALS — SYSTOLIC BLOOD PRESSURE: 109 MMHG | DIASTOLIC BLOOD PRESSURE: 67 MMHG

## 2024-10-09 DIAGNOSIS — C44.222 SQUAMOUS CELL CARCINOMA OF SKIN OF HELIX, RIGHT: Primary | ICD-10-CM

## 2024-10-09 PROCEDURE — 17311 MOHS 1 STAGE H/N/HF/G: CPT | Mod: ,,, | Performed by: STUDENT IN AN ORGANIZED HEALTH CARE EDUCATION/TRAINING PROGRAM

## 2024-10-09 PROCEDURE — 99499 UNLISTED E&M SERVICE: CPT | Mod: ,,, | Performed by: STUDENT IN AN ORGANIZED HEALTH CARE EDUCATION/TRAINING PROGRAM

## 2024-10-09 PROCEDURE — 15260 FTH/GFT FR N/E/E/L 20 SQCM/<: CPT | Mod: 51,,, | Performed by: STUDENT IN AN ORGANIZED HEALTH CARE EDUCATION/TRAINING PROGRAM

## 2024-10-09 RX ORDER — MUPIROCIN 20 MG/G
OINTMENT TOPICAL 2 TIMES DAILY
Qty: 22 G | Refills: 5 | Status: SHIPPED | OUTPATIENT
Start: 2024-10-09

## 2024-10-09 NOTE — PROGRESS NOTES
Mohs Surgery Consult Note    Robert Serrano is a 64 y.o. male who is referred by Dr. Egan for evaluation of a SCC on the right superior helix.     Recurrent skin cancer: No    Preoperative Risk Factors:  Current Anticoagulants: Yes Asa 81 mg and Plavix   Endocarditis / Rheumatic Fever hx: No  Immunocompromised: No  Prosthetic joint: No  Congenital heart defect: No  Prosthetic heart valve: No  Diabetic: No  Transplant: No  Pacemaker: No  Defibrillator:  No  Prior problem with local anesthesia: No  Tobacco History: No]  Clindamycin Allergy: No  Pregnant: no     Transmissible Diseases:  HIV No  Hepatitis B or C  No      Exam:  Limited skin exam is normal except for a SCC located on the right superior helix  .    Pathologic Findings:  Accession # [id number]  Diagnosis: SCC    Assessment and Plan:  Treatment Options : The various treatment options for skin cancer removal were reviewed with the patient in detail. These include Mohs surgery with its high cure rate, excisional surgery, destructive treatment, radiation therapy, and various topical therapies.  Given the indications and high cure rate, the patient has agreed to proceed with Mohs.  Risks and Benefits : The rationale for Mohs was explained to the patient. The risks and benefits to therapy were discussed in detail. Specifically, the risks of infection, scarring, bleeding, dehiscence, hematoma, prolonged wound healing, incomplete removal, allergy to anesthesia, nerve injury, inability to clear the tumor, and recurrence were addressed. The treatment site was clearly identified and confirmed by the patient.    Plan:  Mohs Micrographic Surgery    Antibiotics: Mupirocin ointment bid x 14 days         Ghanshyam Egan MD   Mohs Surgery/Dermatologic Oncology

## 2024-10-09 NOTE — PATIENT INSTRUCTIONS
WOUND CARE INSTRUCTIONS    1. Leave your pressure bandage on for 24 hours (unless told to keep it on for 48 hours). You will not need to perform any wound care until this bandage is removed. Please do not get the bandage wet.  2. When you initially begin wound care, you may let the water hit the pressure bandage to loosen it from your skin. The bandage should be removed before bathing/showering.  3. Wash your hands thoroughly before starting wound care. Do not use the same cloth/rag/sponge you would use to wash the remainder of your body as this may introduce bacteria from other areas of your body and possibly cause infection at the surgical site.  4. Please clean the surgery site once to twice daily with a mild liquid soap (i.e. Dove, Cetaphil, Baby shampoo).   5. Dry the area with a fresh Q-tip or clean gauze.  6. Perform Vinegar soak to the area to help prevent infection. Soak the affected area for 5-10 minutes once daily, then pat dry. To make a quart of the vinegar soak, mix 3 tablespoons white vinegar with 1 quart of luke-warm water.   7. Apply a generous amount of Vaseline or Aquaphor to the wound/sutures (If you have been prescribed antibiotic ointment such as Mupirocin or Gentamicin, use this instead of vaseline/aquaphor). If you are not sure of the sanitary condition of any Vaseline/Aquaphor you may have at home, please purchase a new jar or tube.    8. Cut a non-stick bandage pad to fit the area and then use bandaging tape to hold in place. Paper tape is a good option for very sensitive skin types.  9. You will be doing this wound care regimen until sutures are removed   10.  After surgery, you may restart all your medications that were stopped (if applicable).      If your surgical site is on your forehead, or close to the eye area, you will want to use ice packs. Please apply ice packs every hour for 20 minutes while awake. Sleep elevated for the next two nights as this will help decrease the amount of  bruising and swelling you will notice the evening after surgery and into the next morning.   For surgical areas on your arms/legs, try to keep the area elevated above the level of your heart as much as possible. This will help to decrease swelling. Frequent gentle rubbing of your fingers or toes in that area will prevent numbness and stiffness.   If located on your arm/hand, we ask that you do not lift anything heavier than a gallon of milk for two weeks. Keep the arm/hand elevated to help decrease swelling in the wrist and fingers. Do not wear jewelry as impending swelling could cause discomfort.  For surgical areas on your head/neck, do not bend over or stoop down. Do not drop your head, as this increases blood to the surgical area and can induce bleeding. Refrain from use of hair care products, hair coloring, or permanents until sutures have been removed and/or the surgical site has completely healed.    BATHING: Begin bathing/showering once pressure bandage comes off. Do not let direct water pressure hit the surgery site. It is okay if it gets wet, just let the water roll over.    PAIN: Tylenol (Acetaminophen) or NSAIDs such as ibuprofen (Advil) or naproxen (Aleve) are adequate for pain relief in most cases, if you are able to take those medications. Alternating Tylenol (acetaminophen) and NSAIDs at 3 hour intervals works well as these medications work differently. For instance, take 1 g of Tylenol at hour 0, then 200-400 mg of Advil at hour 3 if needed, then 1 g of Tylenol at hour 6 if needed, then 200-400 mg of Advil at hour 9 if needed. Do not exceed the daily limit for either medication, which can be found on the bottle. We try to avoid narcotic medications as much as possible. If you are still having severe pain not managed by the above methods, please call our clinic.    SIGNS OF POSSIBLE INFECTION: Significant redness surrounding the surgery site that is warm to the touch, persistent or worsening pain,  fever or flu-like symptoms, increased swelling to the area, thick yellow discharge, and/or foul odor. Please call our office as soon as possible if you experience any of these symptoms as you may have an infection.     BLEEDING: A mild amount of blood on the bandage is expected. Soaking through the bandage is not normal. If this occurs, remove the soiled bandage and apply uninterrupted pressure for 20 minutes by the clock. If this does not stop the bleeding, hold pressure for another 20 minutes with an ice pack. If bleeding stops, apply a bandage per wound care instructions.     IF THE BLEEDING PERSISTS, PLEASE CALL OUR OFFICE.     Normal office hours:   After hours:     If you have concerns about how your wound is healing and would like to send us a photo, please send us a [employer] message, or call for instructions on how to securely send an email.

## 2024-10-09 NOTE — PROGRESS NOTES
Mohs Surgery Operative Note    Patient name: Robert Serrano  Date: 10/09/2024    Mohs accession #: [id number]  Previous dermpath accession #: [id number]  Location: R superior helix  Preop diagnosis:SCC  Postop diagnosis: Same  Mohs AUC score: 9  Number of stages: 1  Preop size: 1.2 x 0.8 cm   Postop size: 1.6 x 1.2 cm   Depth of defect: Perichondrium   Repair type: FTSG   Amount of lidocaine used: 4.5 mL (donor site included)   Surgeon and Pathologist: CURRY Egan MD     Indications for Mohs Surgery    Removal of the patient's tumor is complicated by the following clinical features: Clinical area critical for tissue conservation (Area H: central face, eyelids, eyebrows, nose, lips, chin, ear, periauricular, temple, genitalia, hands, feet, ankles, nail units and areola)    Based on my medical judgement, Mohs surgery is the most appropriate treatment for this cancer compared to other treatments. I discussed alternative treatments to Mohs surgery and specifically discussed the risks and benefits of curettage, excision with permanent sections, and foregoing treatment. The rationale for Mohs was explained to the patient and consent was obtained. The risks, benefits and alternatives to therapy were discussed in detail. Specifically, the risks of infection, scarring, bleeding, prolonged wound healing, incomplete removal, allergy to anesthesia, nerve injury and recurrence were addressed. Prior to the procedure, the treatment site was clearly identified and confirmed by the patient. All components of Universal Protocol/PAUSE Rule completed. RAS Egan MD operated in two distinct and integrated capacities as the surgeon and pathologist.    STAGE I:  The patient was placed on the operating table. The cancer was identified and outlined. The entire surgical field was prepped with chlorhexidine The surgical site was anesthetized using Lidocaine 1% with epinephrine 1:100,000 buffered with sodium bicarbonate 8.4% in a  1:10 ratio.    The area of clinically apparent tumor was debulked with a 2 mm curette. The layer of tissue was then surgically excised using a #15 blade and was then transferred onto a specimen sheet maintaining the orientation of the specimen. Hemostasis was obtained using monopolar electrodesiccation. The wound site was then covered with a dressing while the tissue samples were processed for examination.    The specimen was oriented, mapped and divided. Each section was then inked and processed in the Mohs lab using the Mohs protocol and submitted for frozen section. The histopathologic sections were reviewed by the surgeon in conjunction with the reference map.     Total blocks: 1 Total slides: 2    Frozen section analysis revealed: No additional tumor identified on microscopic examination by the surgeon. Histology: No malignant cells seen in the sections examined.    Additional Histologic Findings: none     REPAIR: Full Thickness Skin Graft    Indication for skin graft:  Suitable location for skin grafting; Avoid high tension repair; Not feasible to close primarily; Prevent free margin distortion  Primary Surgeon : CURRY Egan   Repair Size: 1.6 x 1.2 cm (graft), 3 cm (donor)  Sutures:  4-0 monocryl; 5-0 prolene     The defect was assessed and a donor site on the R pre auricular cheek was identified with similar characteristics to the sacrificed tissue. A marking pen was used to plan the repair. The donor and recipient sites were infiltrated with Lidocaine 1% with epinephrine 1:100,000 buffered with sodium bicarbonate 8.4% in a 1:10 ratio, prepped with chlorhexidine and draped with sterile towels.  The defect (recipient site) was trimmed and debeveled. The donor site was then addressed and incised sharply using a #15 blade to adipose, excised, thinned and trimmed. Hemostasis was obtained using monopolar electrodesiccation. The donor site was undermined widely and approximated with buried vertical mattress  sutures placed in the dermis and subcutaneous tissue. The graft was meticulously secured to the recipient site with prolene sutures. Percutaneous simple running sutures were carefully placed for maximum eversion and meticulous wound edge approximation at the donor site.   The wound was cleansed with saline and ointment was applied along the wound surface. A sterile pressure dressing was applied. Wound care instructions were given verbally and in writing. The patient left the operating suite in stable condition. Patient was informed that additional refinement of the resulting surgical scar may be used as a second stage of this reconstruction.    Ghanshyam Egan MD   Mohs Surgery/Dermatologic Oncology

## 2024-10-23 ENCOUNTER — CLINICAL SUPPORT (OUTPATIENT)
Dept: DERMATOLOGY | Facility: CLINIC | Age: 64
End: 2024-10-23
Payer: COMMERCIAL

## 2024-10-23 DIAGNOSIS — D48.9 NEOPLASM OF UNCERTAIN BEHAVIOR: Primary | ICD-10-CM

## 2024-10-23 DIAGNOSIS — Z48.02 VISIT FOR SUTURE REMOVAL: ICD-10-CM

## 2024-10-23 NOTE — PROGRESS NOTES
[facility]  Ghanshyam Egan MD    [address] [place] [address]  [phone]    Fax: [phone]    Patient Name: Robert Serrano  Medical Record Number: [id number]  PCP: Nic Navarro MD  Age: 64 y.o. : 1960  Contact: [phone] (home)     History of Present Illness:     Robert Serrano is a 64 y.o.  male here for suture removal of the right helix s/p Mohs 10/09/24. He complains of tender papules on the left and right lower lips.    The patient has no other concerns today.    Review of Systems:     Unremarkable other than mentioned above.     Physical Exam:     General: Relaxed, oriented, alert    Skin examination of the scalp, face, neck, chest, back, abdomen, upper extremities and lower extremities were normal except for as listed below      Assessment and Plan:     1. Suture removal    Plan:  - SR removed from graft and donor site, incisions have healed well. SR tolerated well    2. Neoplasm of Uncertain Behavior x 2     A) tender papule located on the left lower lip   Ddx includes: SCC    B) tender papule located on the right lower lip   Ddx includes: SCC    Shave biopsy  x 2     Pre-procedure Diagnosis: as above  Post_procedure Diagnosis: same  Estimated Blood Loss: <1cc    Findings: None  Complications: None  Specimens: to pathology      Written informed consent was obtained after discussing risks including pain, bleeding, infection, recurrence and scarring. The biopsy site was sterilely prepped with alcohol, which was allowed to dry completely, then locally infiltrated with 1% lidocaine with epinephrine, ~3 cc total. A shave biopsy was obtained using a Dermablade/15 and the specimen was sent to dermatopathology. Aluminum chloride was used for hemostasis. Antibiotic ointment and a clean dressing were applied. The patient tolerated the procedure well without complications. Verbal and written wound care instructions were given.    Ghanshyam  MD Ghanshyam Egan MD   Mohs Surgery/Dermatologic Oncology  Dermatology

## 2024-11-18 ENCOUNTER — PROCEDURE VISIT (OUTPATIENT)
Dept: DERMATOLOGY | Facility: CLINIC | Age: 64
End: 2024-11-18
Payer: COMMERCIAL

## 2024-11-18 VITALS — DIASTOLIC BLOOD PRESSURE: 77 MMHG | HEART RATE: 65 BPM | SYSTOLIC BLOOD PRESSURE: 121 MMHG

## 2024-11-18 DIAGNOSIS — C44.02 SCC (SQUAMOUS CELL CARCINOMA), LIP: Primary | ICD-10-CM

## 2024-11-18 RX ORDER — CEPHALEXIN 500 MG/1
500 CAPSULE ORAL EVERY 8 HOURS
Qty: 21 CAPSULE | Refills: 0 | Status: SHIPPED | OUTPATIENT
Start: 2024-11-18 | End: 2024-11-25

## 2024-11-18 RX ORDER — HYDROCODONE BITARTRATE AND ACETAMINOPHEN 5; 325 MG/1; MG/1
1 TABLET ORAL EVERY 6 HOURS PRN
Qty: 20 TABLET | Refills: 0 | Status: SHIPPED | OUTPATIENT
Start: 2024-11-18

## 2024-11-18 NOTE — PROGRESS NOTES
Mohs Surgery Operative Note    Patient name: Robert Serrano  Date: 11/18/2024    Mohs accession #:   Previous dermpath accession #: [id number]  Location: Left lower lip  Preop diagnosis:SCCis  Postop diagnosis: SCC  Mohs AUC score: 9  Number of stages: 2  Preop size: 3.5 x 0.7 cm  Postop size: 4.0 x 1.6 cm  Depth of defect: muscle  Repair type: Advancement   Amount of lidocaine used: 10 cc of 2% with epi   Surgeon and Pathologist: CURRY Egan MD     Indications for Mohs Surgery    Removal of the patient's tumor is complicated by the following clinical features: Clinical area critical for tissue conservation (Area H: central face, eyelids, eyebrows, nose, lips, chin, ear, periauricular, temple, genitalia, hands, feet, ankles, nail units and areola)    Based on my medical judgement, Mohs surgery is the most appropriate treatment for this cancer compared to other treatments. I discussed alternative treatments to Mohs surgery and specifically discussed the risks and benefits of curettage, excision with permanent sections, and foregoing treatment. The rationale for Mohs was explained to the patient and consent was obtained. The risks, benefits and alternatives to therapy were discussed in detail. Specifically, the risks of infection, scarring, bleeding, prolonged wound healing, incomplete removal, allergy to anesthesia, nerve injury and recurrence were addressed. Prior to the procedure, the treatment site was clearly identified and confirmed by the patient. All components of Universal Protocol/PAUSE Rule completed. RAS Egan MD operated in two distinct and integrated capacities as the surgeon and pathologist.    STAGE I:  The patient was placed on the operating table. The cancer was identified and outlined. The entire surgical field was prepped with chlorhexidine The surgical site was anesthetized using Lidocaine 1% with epinephrine 1:100,000 buffered with sodium bicarbonate 8.4% in a 1:10  ratio.    The area of clinically apparent tumor was debulked with a 2 mm curette. The layer of tissue was then surgically excised using a #15 blade and was then transferred onto a specimen sheet maintaining the orientation of the specimen. Hemostasis was obtained using monopolar electrodesiccation. The wound site was then covered with a dressing while the tissue samples were processed for examination.    The specimen was oriented, mapped and divided. Each section was then inked and processed in the Mohs lab using the Mohs protocol and submitted for frozen section. The histopathologic sections were reviewed by the surgeon in conjunction with the reference map.     Total blocks: 2 Total slides: 6    Frozen section analysis revealed: residual tumor present on stage 1. Tumor was indicated in red on the reference map.    Cell morphology: Atypical squamous cell nests infiltrating into the dermis  Pathological Pattern: Squamous cell carcinoma   Depth of invasion: Fat  Presence of scar tissue: No  Perineural invasion: No  Actinic keratosis: Yes  Inflammation obscuring possible tumor presence: No    STAGE II:  The patient was prepped in the same fashion as the first stage. Using a similar technique to that described above, a thin layer of tissue was removed from all areas where tumor was visible on the previous stage. The tissue was again oriented, mapped, dyed, and processed as above. Histopathologic sections were reviewed in conjunction with the reference map.     Total blocks: 1 Total slides: 3    Frozen section analysis revealed: No additional tumor identified on microscopic examination by the surgeon. Histology: No malignant cells seen in the sections examined.    Additional Histologic Findings: None      REPAIR: Mucosal Advancement Flap    Indication for flap: A flap was chosen because closing the wound by second intention, primary linear closure, or when skin grafting would result in a functionally unsatisfactory  result. Additionally, a flap was chosen to recruit additional tissue, displace tension away from the defect and any nearby free margins, as well as to re-orient tension vectors in more favorable directions    Primary Surgeon : CURRY Egan MD  Repair Size: 8 x 4 cm  Sutures:  4-0 monocryl; 5-0 prolene     The defect was identified and a marking pen was used to plan the repair. The area was infiltrated with Lidocaine 1% with epinephrine 1:100,000 buffered with sodium bicarbonate 8.4% in a 1:10 ratio, prepped with chlorhexidine and draped with sterile towels.  Vermilionectomy was performed for the remaining vermilion lip by incising with a 15 blade to the level of the orbicularis oris muscle. The defect was debeveled and undermined deep to the salivary glands and superficial to the orbicularis muscle to the gingival sulcus.  Hemostasis was obtained using monopolar electrodesiccation. The flap was carried over and secured with buried vertical mattress sutures placed in the dermis and subcutaneous tissue. An additional standing cone was removed opposite the flap to minimize tissue deformity. Percutaneous simple running sutures were carefully placed for maximum eversion and meticulous wound edge approximation. Careful attention was paid to avoid distorting any nearby free margins.  The wound was cleansed with saline and ointment was applied along the wound surface. A sterile pressure dressing was applied. Wound care instructions were given verbally and in writing. The patient left the operating suite in stable condition. Patient was informed that additional refinement of the resulting surgical scar may be used as a second stage of this reconstruction.    Ghanshyam Egan MD   Mohs Surgery/Dermatologic Oncology

## 2024-11-18 NOTE — PROGRESS NOTES
Mohs Surgery Consult Note    Robert Serrano is a 64 y.o. male who is referred by Dr. Egan for evaluation of a SCCis on the left and right lower lip.     Recurrent skin cancer: No    Preoperative Risk Factors:  Current Anticoagulants: Yes Asa 81 mg and Plavix   Endocarditis / Rheumatic Fever hx: No  Immunocompromised: No  Prosthetic joint: No  Congenital heart defect: No  Prosthetic heart valve: No  Diabetic: No  Transplant: No  Pacemaker: No  Defibrillator:  No  Prior problem with local anesthesia: No  Tobacco History: No]  Clindamycin Allergy: No  Pregnant: no     Transmissible Diseases:  HIV No  Hepatitis B or C  No      Exam:  Limited skin exam is normal except for a  SCCis  located on the left and right lower lip  .    Pathologic Findings:  Accession # [id number]  Diagnosis: SCCis    Assessment and Plan:  Treatment Options : The various treatment options for skin cancer removal were reviewed with the patient in detail. These include Mohs surgery with its high cure rate, excisional surgery, destructive treatment, radiation therapy, and various topical therapies.  Given the indications and high cure rate, the patient has agreed to proceed with Mohs.  Risks and Benefits : The rationale for Mohs was explained to the patient. The risks and benefits to therapy were discussed in detail. Specifically, the risks of infection, scarring, bleeding, dehiscence, hematoma, prolonged wound healing, incomplete removal, allergy to anesthesia, nerve injury, inability to clear the tumor, and recurrence were addressed. The treatment site was clearly identified and confirmed by the patient.    Plan:  Mohs Micrographic Surgery    Antibiotics:  Keflex 500 mg tid x 7 days       Ghanshyam Egan MD   Mohs Surgery/Dermatologic Oncology

## 2024-11-18 NOTE — PATIENT INSTRUCTIONS
WOUND CARE INSTRUCTIONS    1. Leave your pressure bandage on for 24 hours (unless told to keep it on for 48 hours). You will not need to perform any wound care until this bandage is removed. Please do not get the bandage wet.  2. When you initially begin wound care, you may let the water hit the pressure bandage to loosen it from your skin. The bandage should be removed before bathing/showering.  3. Wash your hands thoroughly before starting wound care. Do not use the same cloth/rag/sponge you would use to wash the remainder of your body as this may introduce bacteria from other areas of your body and possibly cause infection at the surgical site.  4. Please clean the surgery site once to twice daily with a mild liquid soap (i.e. Dove, Cetaphil, Baby shampoo).   5. Dry the area with a fresh Q-tip or clean gauze.  6. Perform Vinegar soak to the area to help prevent infection. Soak the affected area for 5-10 minutes once daily, then pat dry. To make a quart of the vinegar soak, mix 3 tablespoons white vinegar with 1 quart of luke-warm water.   7. Apply a generous amount of Vaseline or Aquaphor to the wound/sutures (If you have been prescribed antibiotic ointment such as Mupirocin or Gentamicin, use this instead of vaseline/aquaphor). If you are not sure of the sanitary condition of any Vaseline/Aquaphor you may have at home, please purchase a new jar or tube.    8. Cut a non-stick bandage pad to fit the area and then use bandaging tape to hold in place. Paper tape is a good option for very sensitive skin types.  9. You will be doing this wound care regimen until sutures are removed   10.  After surgery, you may restart all your medications that were stopped (if applicable).      If your surgical site is on your forehead, or close to the eye area, you will want to use ice packs. Please apply ice packs every hour for 20 minutes while awake. Sleep elevated for the next two nights as this will help decrease the amount of  bruising and swelling you will notice the evening after surgery and into the next morning.   For surgical areas on your arms/legs, try to keep the area elevated above the level of your heart as much as possible. This will help to decrease swelling. Frequent gentle rubbing of your fingers or toes in that area will prevent numbness and stiffness.   If located on your arm/hand, we ask that you do not lift anything heavier than a gallon of milk for two weeks. Keep the arm/hand elevated to help decrease swelling in the wrist and fingers. Do not wear jewelry as impending swelling could cause discomfort.  For surgical areas on your head/neck, do not bend over or stoop down. Do not drop your head, as this increases blood to the surgical area and can induce bleeding. Refrain from use of hair care products, hair coloring, or permanents until sutures have been removed and/or the surgical site has completely healed.    BATHING: Begin bathing/showering once pressure bandage comes off. Do not let direct water pressure hit the surgery site. It is okay if it gets wet, just let the water roll over.    PAIN: Tylenol (Acetaminophen) or NSAIDs such as ibuprofen (Advil) or naproxen (Aleve) are adequate for pain relief in most cases, if you are able to take those medications. Alternating Tylenol (acetaminophen) and NSAIDs at 3 hour intervals works well as these medications work differently. For instance, take 1 g of Tylenol at hour 0, then 200-400 mg of Advil at hour 3 if needed, then 1 g of Tylenol at hour 6 if needed, then 200-400 mg of Advil at hour 9 if needed. Do not exceed the daily limit for either medication, which can be found on the bottle. We try to avoid narcotic medications as much as possible. If you are still having severe pain not managed by the above methods, please call our clinic.    SIGNS OF POSSIBLE INFECTION: Significant redness surrounding the surgery site that is warm to the touch, persistent or worsening pain,  fever or flu-like symptoms, increased swelling to the area, thick yellow discharge, and/or foul odor. Please call our office as soon as possible if you experience any of these symptoms as you may have an infection.     BLEEDING: A mild amount of blood on the bandage is expected. Soaking through the bandage is not normal. If this occurs, remove the soiled bandage and apply uninterrupted pressure for 20 minutes by the clock. If this does not stop the bleeding, hold pressure for another 20 minutes with an ice pack. If bleeding stops, apply a bandage per wound care instructions.     IF THE BLEEDING PERSISTS, PLEASE CALL OUR OFFICE.     Normal office hours:   After hours:     If you have concerns about how your wound is healing and would like to send us a photo, please send us a [employer] message, or call for instructions on how to securely send an email.

## 2024-11-18 NOTE — PROGRESS NOTES
Mohs Surgery Operative Note    Patient name: Robert Serrano  Date: 11/18/2024    Mohs accession #:   Previous dermpath accession #: [id number]  Location: Right lower lip  Preop diagnosis:SCCis  Postop diagnosis: SCC  Mohs AUC score: 9  Number of stages: 3  Preop size: 3.0 x 0.8 cm  Postop size: 3.4 x 1.8 cm  Depth of defect: Muscle  Repair type: Advancement   Amount of lidocaine used: 11.5 cc of 2% with epi   Surgeon and Pathologist: CURRY Egan MD     Indications for Mohs Surgery    Removal of the patient's tumor is complicated by the following clinical features: Clinical area critical for tissue conservation (Area H: central face, eyelids, eyebrows, nose, lips, chin, ear, periauricular, temple, genitalia, hands, feet, ankles, nail units and areola)    Based on my medical judgement, Mohs surgery is the most appropriate treatment for this cancer compared to other treatments. I discussed alternative treatments to Mohs surgery and specifically discussed the risks and benefits of curettage, excision with permanent sections, and foregoing treatment. The rationale for Mohs was explained to the patient and consent was obtained. The risks, benefits and alternatives to therapy were discussed in detail. Specifically, the risks of infection, scarring, bleeding, prolonged wound healing, incomplete removal, allergy to anesthesia, nerve injury and recurrence were addressed. Prior to the procedure, the treatment site was clearly identified and confirmed by the patient. All components of Universal Protocol/PAUSE Rule completed. RAS Egan MD operated in two distinct and integrated capacities as the surgeon and pathologist.    STAGE I:  The patient was placed on the operating table. The cancer was identified and outlined. The entire surgical field was prepped with chlorhexidine The surgical site was anesthetized using Lidocaine 1% with epinephrine 1:100,000 buffered with sodium bicarbonate 8.4% in a 1:10  ratio.    The area of clinically apparent tumor was debulked with a 2 mm curette. The layer of tissue was then surgically excised using a #15 blade and was then transferred onto a specimen sheet maintaining the orientation of the specimen. Hemostasis was obtained using monopolar electrodesiccation. The wound site was then covered with a dressing while the tissue samples were processed for examination.    The specimen was oriented, mapped and divided. Each section was then inked and processed in the Mohs lab using the Mohs protocol and submitted for frozen section. The histopathologic sections were reviewed by the surgeon in conjunction with the reference map.     Total blocks: 1 Total slides: 8    Frozen section analysis revealed: residual tumor present on stage 1. Tumor was indicated in red on the reference map.    Cell morphology: Atypical squamous cell nests infiltrating into the dermis  Pathological Pattern: Squamous cell carcinoma   Depth of invasion: Fat  Presence of scar tissue: No  Perineural invasion: No  Actinic keratosis: No  Inflammation obscuring possible tumor presence: No    STAGE II:  The patient was prepped in the same fashion as the first stage. Using a similar technique to that described above, a thin layer of tissue was removed from all areas where tumor was visible on the previous stage. The tissue was again oriented, mapped, dyed, and processed as above. Histopathologic sections were reviewed in conjunction with the reference map.     Total blocks: 1 Total slides: 2    Residual tumor was identified and indicated in red on the reference map, identifying the location where further tissue excision was necessary.  Therefore, an additional stage of Mohs Micrographic surgery was deemed necessary. Tumor characteristics were the same as the first stage.    STAGE III:  The patient was prepped in the same fashion as the first stage. Using a similar technique to that described above, a thin layer of  tissue was removed from all areas where tumor was visible on the previous stage. The tissue was again oriented, mapped, dyed, and processed as above. Histopathologic sections were reviewed in conjunction with the reference map.     Total blocks: 1 Total slides: 2    Frozen section analysis revealed: No additional tumor identified on microscopic examination by the surgeon. Histology: No malignant cells seen in the sections examined.    Additional Histologic Findings: None      REPAIR: Mucosal Advancement Flap (Combined repair for L mucosal lip defect)     Indication for flap: A flap was chosen because closing the wound by second intention, primary linear closure, or when skin grafting would result in a functionally unsatisfactory result. Additionally, a flap was chosen to recruit additional tissue, displace tension away from the defect and any nearby free margins, as well as to re-orient tension vectors in more favorable directions    Primary Surgeon : CURRY Egan MD  Repair Size: 8 x 4 cm  Sutures:  4-0 monocryl; 5-0 prolene     The defect was identified and a marking pen was used to plan the repair. The area was infiltrated with Lidocaine 1% with epinephrine 1:100,000 buffered with sodium bicarbonate 8.4% in a 1:10 ratio, prepped with chlorhexidine and draped with sterile towels.  Vermilionectomy was performed for the remaining vermilion lip by incising with a 15 blade to the level of the orbicularis oris muscle. The defect was debeveled and undermined deep to the salivary glands and superficial to the orbicularis muscle to the gingival sulcus.  Hemostasis was obtained using monopolar electrodesiccation. The flap was carried over and secured with buried vertical mattress sutures placed in the dermis and subcutaneous tissue. An additional standing cone was removed opposite the flap to minimize tissue deformity. Percutaneous simple running sutures were carefully placed for maximum eversion and meticulous wound  edge approximation. Careful attention was paid to avoid distorting any nearby free margins.  The wound was cleansed with saline and ointment was applied along the wound surface. A sterile pressure dressing was applied. Wound care instructions were given verbally and in writing. The patient left the operating suite in stable condition. Patient was informed that additional refinement of the resulting surgical scar may be used as a second stage of this reconstruction.    Ghanshyam Egan MD   Mohs Surgery/Dermatologic Oncology

## 2024-11-27 ENCOUNTER — CLINICAL SUPPORT (OUTPATIENT)
Dept: DERMATOLOGY | Facility: CLINIC | Age: 64
End: 2024-11-27
Payer: COMMERCIAL

## 2024-11-27 DIAGNOSIS — Z48.02 ENCOUNTER FOR REMOVAL OF SUTURES: Primary | ICD-10-CM

## 2024-11-27 NOTE — PROGRESS NOTES
Patient name: Robert Serrano  Date: 11/18/2024     Mohs accession #:   Previous dermpath accession #: [id number]  Location: Right lower lip  Preop diagnosis:SCCis  Postop diagnosis: SCC  Mohs AUC score: 9  Number of stages: 3  Preop size: 3.0 x 0.8 cm  Postop size: 3.4 x 1.8 cm  Depth of defect: Muscle  Repair type: Advancement   Amount of lidocaine used: 11.5 cc of 2% with epi   Surgeon and Pathologist: CURRY Egan MD         Sutures removed with no complications.   No S/s of infection.   Wound is healing well.   RTC in 4 weeks for wound check.     Asha Velasquez CMA

## 2024-12-09 ENCOUNTER — CLINICAL SUPPORT (OUTPATIENT)
Dept: DERMATOLOGY | Facility: CLINIC | Age: 64
End: 2024-12-09
Payer: COMMERCIAL

## 2024-12-09 DIAGNOSIS — C44.02 SCC (SQUAMOUS CELL CARCINOMA), LIP: ICD-10-CM

## 2024-12-09 DIAGNOSIS — Z51.89 VISIT FOR WOUND CHECK: Primary | ICD-10-CM

## 2024-12-09 DIAGNOSIS — L08.9 SKIN INFECTION: ICD-10-CM

## 2024-12-09 PROCEDURE — 87070 CULTURE OTHR SPECIMN AEROBIC: CPT | Mod: ,,, | Performed by: CLINICAL MEDICAL LABORATORY

## 2024-12-09 PROCEDURE — 99499 UNLISTED E&M SERVICE: CPT | Mod: ,,, | Performed by: STUDENT IN AN ORGANIZED HEALTH CARE EDUCATION/TRAINING PROGRAM

## 2024-12-09 RX ORDER — HYDROCODONE BITARTRATE AND ACETAMINOPHEN 5; 325 MG/1; MG/1
1 TABLET ORAL EVERY 8 HOURS PRN
Qty: 15 TABLET | Refills: 0 | Status: SHIPPED | OUTPATIENT
Start: 2024-12-09 | End: 2024-12-14

## 2024-12-09 RX ORDER — CEPHALEXIN 500 MG/1
500 CAPSULE ORAL EVERY 8 HOURS
Qty: 21 CAPSULE | Refills: 0 | Status: SHIPPED | OUTPATIENT
Start: 2024-12-09 | End: 2024-12-16

## 2024-12-09 NOTE — PROGRESS NOTES
Patient name: Robert Serrano  Date: 11/18/2024     Mohs accession #:   Previous dermpath accession #: [id number]  Location: Right lower lip  Preop diagnosis:SCCis  Postop diagnosis: SCC  Mohs AUC score: 9  Number of stages: 3  Preop size: 3.0 x 0.8 cm  Postop size: 3.4 x 1.8 cm  Depth of defect: Muscle  Repair type: Advancement   Amount of lidocaine used: 11.5 cc of 2% with epi   Surgeon and Pathologist: CURRY Egan MD     Mohs accession #:   Previous dermpath accession #: [id number]  Location: Left lower lip  Preop diagnosis:SCCis  Postop diagnosis: SCC  Mohs AUC score: 9  Number of stages: 2  Preop size: 3.5 x 0.7 cm  Postop size: 4.0 x 1.6 cm  Depth of defect: muscle  Repair type: Advancement   Amount of lidocaine used: 10 cc of 2% with epi   Surgeon and Pathologist: CURRY Egan MD         Pt states area is painful and red. Wound is healing well.  Wound culture was obtained from lip.  Per Dr. Egan begin  Keflex 500mg TID. RTC 12/27/2024 for wound check    Asha Velasquez CMA

## 2024-12-11 LAB — MICROORGANISM SPEC CULT: NORMAL

## 2024-12-27 ENCOUNTER — CLINICAL SUPPORT (OUTPATIENT)
Dept: DERMATOLOGY | Facility: CLINIC | Age: 64
End: 2024-12-27
Payer: COMMERCIAL

## 2024-12-27 DIAGNOSIS — L57.0 ACTINIC KERATOSES: Primary | ICD-10-CM

## 2024-12-27 NOTE — PROGRESS NOTES
Patient name: Robert Serrano  Date: 11/18/2024     Mohs accession #:   Previous dermpath accession #: [id number]  Location: Right lower lip  Preop diagnosis:SCCis  Postop diagnosis: SCC  Mohs AUC score: 9  Number of stages: 3  Preop size: 3.0 x 0.8 cm  Postop size: 3.4 x 1.8 cm  Depth of defect: Muscle  Repair type: Advancement   Amount of lidocaine used: 11.5 cc of 2% with epi   Surgeon and Pathologist: CURRY Egan MD     Mohs accession #:   Previous dermpath accession #: [id number]  Location: Left lower lip  Preop diagnosis:SCCis  Postop diagnosis: SCC  Mohs AUC score: 9  Number of stages: 2  Preop size: 3.5 x 0.7 cm  Postop size: 4.0 x 1.6 cm  Depth of defect: muscle  Repair type: Advancement   Amount of lidocaine used: 10 cc of 2% with epi   Surgeon and Pathologist: CURRY Egan MD       Patient is here for . Scars have healed well. RTC 03/20/25 for FSE.    Actinic Keratoses  Erythematous, scaly papules on L temple     Plan: Liquid Nitrogen.  A total of 1 lesions were treated with liquid nitrogen for 2 freeze-thaw cycles lasting 5 seconds, located on the above locations.   The patient's consent was obtained including but not limited to risks of crusting, scabbing,  blistering, scarring, darker or lighter pigmentary change, recurrence, incomplete removal and infection.    Counseling.  Sun protective clothing and broad spectrum sunscreen can prevent the formation of AK.   AKs can be treated with cryotherapy, photodynamic therapy, imiquimod, topical 5-FU.  Actinic Keratoses are precancerous proliferations that occur within sun damaged skin. If untreated,  a small subset of AKs can develop into Squamous Cell Carcinoma.              Nicole Berumen LPN/IVC

## 2025-02-13 ENCOUNTER — OFFICE VISIT (OUTPATIENT)
Dept: DERMATOLOGY | Facility: CLINIC | Age: 65
End: 2025-02-13
Payer: COMMERCIAL

## 2025-02-13 DIAGNOSIS — B07.9 VERRUCA VULGARIS: Primary | ICD-10-CM

## 2025-02-13 DIAGNOSIS — Z85.828 HISTORY OF NONMELANOMA SKIN CANCER: ICD-10-CM

## 2025-02-13 RX ORDER — IMIQUIMOD 12.5 MG/.25G
CREAM TOPICAL
Qty: 12 PACKET | Refills: 3 | Status: SHIPPED | OUTPATIENT
Start: 2025-02-14 | End: 2025-02-13

## 2025-02-13 RX ORDER — IMIQUIMOD 12.5 MG/.25G
CREAM TOPICAL
Qty: 24 PACKET | Refills: 3 | Status: SHIPPED | OUTPATIENT
Start: 2025-02-14

## 2025-02-13 NOTE — PROGRESS NOTES
[facility]  Ghanshyam Egan MD    [address] [place] [address]  [phone]    Fax: [phone]    Patient Name: Robert Serrano  Medical Record Number: [id number]  PCP: Nic Navarro MD  Age: 64 y.o. : 1960  Contact: [phone] (home)     History of Present Illness:     Robert Serrano is a 64 y.o.  male here for follow up of hx of NMSC (most recent SCCis right and left lower lip s/p mohs 24) Today, he is concerned about warts of his upper and lower lip      The patient has no other concerns today.    Review of Systems:     Unremarkable other than mentioned above.     Physical Exam:     General: Relaxed, oriented, alert    Skin examination of the scalp, face, neck, chest, back, abdomen, upper extremities and lower extremities were normal except for as listed below      Assessment and Plan:     1. History of NMSC   Well-healed scar on right and lower lip  No e/o recurrence   Recommend sunscreen and good photoprotection   - informed that dryness is normal for this part of the repair, keep moisten.    2. Verruca Vulgaris  - verrucous papules with thrombosed capillary loops located on the upper lip, lower lip     Plan:    Liquid Nitrogen  A total of 4 lesion(s) was treated with liquid nitrogen, located on the above listed location.  This procedure was medically necessary because the lesions that were treated were: enlarging, inflamed, and contagious. The patient's consent was obtained including but not limited to risks of crusting, scabbing, blistering, scarring, darker or lighter pigmentary change, recurrence, incomplete removal and infection.    - Aldara cream 3x weekly, leave on for 8 hours prior to rinsing     Counseling  Viral nature was discussed, and the risk of the warts spreading   Verruca Vulgaris can be treated with retinoids, aldara, salicylic acid preparations, cryotherapy, candida antigen, or cantharidin  HPV vaccination is  recommended   Over the counter Wart Stick can be applied daily as an adjunct therapy (wait until blister heals from cryotherapy)         Ghanshyam Egan MD   Mohs Surgery/Dermatologic Oncology  Dermatology

## 2025-02-13 NOTE — PATIENT INSTRUCTIONS
"Liquid Nitrogen Wound Care     - the areas treated with liquid nitrogen may form sores, blisters, and scabs. This is normal   - if a blister forms, please keep it intact and do not rupture it. It will resolve within a few days   - please keep petrolatum ointment to the area once to twice daily. You can cover with a bandage if you wish, but it is usually not necessary   - the area may be painful for a few days. We recommend ice, or over the counter tylenol or NSAIDs (such as ibuprofen or naproxen, if you are able to take these)   - this may result in a scar or a "hypopigmented" or lighter area of skin. This may or may not resolve with time   - please call our clinic if the lesion does not resolve, as this can be treated again       - stop aldara cream one week before visit, please allow lips to heal from freezing before using cream   "

## 2025-03-20 ENCOUNTER — OFFICE VISIT (OUTPATIENT)
Dept: DERMATOLOGY | Facility: CLINIC | Age: 65
End: 2025-03-20
Payer: COMMERCIAL

## 2025-03-20 DIAGNOSIS — B07.8 OTHER VIRAL WARTS: ICD-10-CM

## 2025-03-20 DIAGNOSIS — L82.0 INFLAMED SEBORRHEIC KERATOSIS: ICD-10-CM

## 2025-03-20 DIAGNOSIS — L57.0 AK (ACTINIC KERATOSIS): Primary | ICD-10-CM

## 2025-03-20 DIAGNOSIS — Z85.828 HISTORY OF NONMELANOMA SKIN CANCER: ICD-10-CM

## 2025-03-20 DIAGNOSIS — L82.1 SK (SEBORRHEIC KERATOSIS): ICD-10-CM

## 2025-03-20 NOTE — PROGRESS NOTES
[facility]  Ghanshyam Egan MD    [address] [address]  [phone]    Fax: [phone]    Patient Name: Robert Serrano  Medical Record Number: [id number]  PCP: Nic Navarro MD  Age: 65 y.o. : 1960  Contact: [phone] (home)     History of Present Illness:     Robert Serrano is a 65 y.o.  male here for follow up of history of NMSC (SCC of L forearm s/p excision). Patient is concerned with lesion located on the right neck. Patient is also following up on the warts located on the lips that were treated with LN2 and aldara with improvement     The patient has no other concerns today.    Review of Systems:     Unremarkable other than mentioned above.     Physical Exam:     General: Relaxed, oriented, alert    Skin examination of the scalp, face, neck, chest, back, abdomen, upper extremities and lower extremities were normal except for as listed below      Assessment and Plan:     1. History of NMSC   Well-healed scar on right and left lower lip  No e/o recurrence   Recommend sunscreen and good photoprotection       2. Seborrheic keratoses   - brown stuck on appearing papules/plaques  - patient educated on benign nature. No treatment necessary unless they become irritated or inflamed       3. Other Viral Wart  - verrucous papules with thrombosed capillary loops located on the upper and lower lips     Plan:    Liquid Nitrogen  A total of 4 lesion(s) was treated with liquid nitrogen, located on the above listed location.  This procedure was medically necessary because the lesions that were treated were: enlarging, inflamed, and contagious. The patient's consent was obtained including but not limited to risks of crusting, scabbing, blistering, scarring, darker or lighter pigmentary change, recurrence, incomplete removal and infection.    Counseling  Viral nature was discussed, and the risk of the warts spreading   Verruca Vulgaris can be  treated with retinoids, aldara, salicylic acid preparations, cryotherapy, candida antigen, or cantharidin  HPV vaccination is recommended   Over the counter Wart Stick can be applied daily as an adjunct therapy (wait until blister heals from cryotherapy)     -continue aldara   -begin cidofovir to noble   -will do HPV vaccine     4. Actinic Keratoses  Erythematous, scaly papules on right ear     Plan: Liquid Nitrogen.  A total of 1 lesions were treated with liquid nitrogen for 2 freeze-thaw cycles lasting 5 seconds, located on the above locations.   The patient's consent was obtained including but not limited to risks of crusting, scabbing,  blistering, scarring, darker or lighter pigmentary change, recurrence, incomplete removal and infection.    Counseling.  Sun protective clothing and broad spectrum sunscreen can prevent the formation of AK.   AKs can be treated with cryotherapy, photodynamic therapy, imiquimod, topical 5-FU.  Actinic Keratoses are precancerous proliferations that occur within sun damaged skin. If untreated,  a small subset of AKs can develop into Squamous Cell Carcinoma.    5.Irritated Seborrheic Keratoses (L82.0)  Stuck-on inflamed papules with crust located on the right neck  Associated diagnoses: Pruritus and Cutaneous Inflammation    Plan: Liquid Nitrogen.  A total of 1 lesions were treated with liquid nitrogen, located on the above listed location.  This procedure was medically necessary because the lesions that were treated were: irritated and itchy. The  patient's consent was obtained including but not limited to risks of crusting, scabbing, blistering, scarring, darker  or lighter pigmentary change, recurrence, incomplete removal and infection.          Ghanshyam Egan MD   Mohs Surgery/Dermatologic Oncology  Dermatology

## 2025-05-02 ENCOUNTER — OFFICE VISIT (OUTPATIENT)
Dept: DERMATOLOGY | Facility: CLINIC | Age: 65
End: 2025-05-02
Payer: COMMERCIAL

## 2025-05-02 DIAGNOSIS — B07.8 OTHER VIRAL WARTS: Primary | ICD-10-CM

## 2025-05-02 PROCEDURE — 17110 DESTRUCTION B9 LES UP TO 14: CPT | Mod: ,,, | Performed by: STUDENT IN AN ORGANIZED HEALTH CARE EDUCATION/TRAINING PROGRAM

## 2025-05-02 PROCEDURE — 99499 UNLISTED E&M SERVICE: CPT | Mod: ,,, | Performed by: STUDENT IN AN ORGANIZED HEALTH CARE EDUCATION/TRAINING PROGRAM

## 2025-05-02 NOTE — PROGRESS NOTES
[facility]  Ghanshyam Egan MD    [facility] [address] [place] [address]  [phone]    Fax: [phone]    Patient Name: Robert Serrano  Medical Record Number: [id number]  PCP: Nic Navarro MD  Age: 65 y.o. : 1960  Contact: [phone] (home)     History of Present Illness:     Robert Serrano is a 65 y.o.  male here for follow up of perioral Warts previously treated with LN2. Patient continues to develop new warts. He was unable to get HPV vaccine.     The patient has no other concerns today.    Review of Systems:     Unremarkable other than mentioned above.     Physical Exam:     General: Relaxed, oriented, alert    Skin examination of the scalp, face, neck, chest, back, abdomen, upper extremities and lower extremities were normal except for as listed below      Assessment and Plan:     1. Other Viral Wart  - verrucous papules with thrombosed capillary loops located on the upper and lower lips    Plan:    Liquid Nitrogen  A total of 8 lesion(s) was treated with liquid nitrogen, located on the above listed location.  This procedure was medically necessary because the lesions that were treated were: enlarging, inflamed, and contagious. The patient's consent was obtained including but not limited to risks of crusting, scabbing, blistering, scarring, darker or lighter pigmentary change, recurrence, incomplete removal and infection.    Counseling  Viral nature was discussed, and the risk of the warts spreading   Verruca Vulgaris can be treated with retinoids, aldara, salicylic acid preparations, cryotherapy, candida antigen, or cantharidin  HPV vaccination is recommended   Over the counter Wart Stick can be applied daily as an adjunct therapy (wait until blister heals from cryotherapy)     continue aldara   -continue cidofovir cream   -will re-order HPV vaccine   - avoid shaving in these area to prevent spread             Ghanshyam Egan MD   Mohs  Surgery/Dermatologic Oncology  Dermatology

## 2025-05-06 DIAGNOSIS — B07.8 OTHER VIRAL WARTS: ICD-10-CM

## 2025-05-06 NOTE — TELEPHONE ENCOUNTER
----- Message from Ludivina sent at 5/6/2025  2:23 PM CDT -----  Who Called: Robert SerranoPatient's Preferred Phone Number on File: [phone] Best Call Back Number, if different:Additional Information: pt does not know the name of the medication you gave them and stated they have misplaced it and wants to see if you can call another medication

## 2025-06-17 ENCOUNTER — OFFICE VISIT (OUTPATIENT)
Dept: DERMATOLOGY | Facility: CLINIC | Age: 65
End: 2025-06-17
Payer: COMMERCIAL

## 2025-06-17 DIAGNOSIS — B07.8 OTHER VIRAL WARTS: Primary | ICD-10-CM

## 2025-06-17 PROCEDURE — 99499 UNLISTED E&M SERVICE: CPT | Mod: ,,, | Performed by: STUDENT IN AN ORGANIZED HEALTH CARE EDUCATION/TRAINING PROGRAM

## 2025-06-17 PROCEDURE — 17110 DESTRUCTION B9 LES UP TO 14: CPT | Mod: ,,, | Performed by: STUDENT IN AN ORGANIZED HEALTH CARE EDUCATION/TRAINING PROGRAM

## 2025-06-17 NOTE — PROGRESS NOTES
[facility]  Ghanshyam Egan MD    [address] [place] [address]  [phone]    Fax: [phone]    Patient Name: Robert Serrano  Medical Record Number: [id number]  PCP: Nic Navarro MD  Age: 65 y.o. : 1960  Contact: [phone] (home)     History of Present Illness:     Robert Serrano is a 65 y.o.  male here for follow up of perioral Warts previously treated with LN2, aldara, and cidofovir cream. Patient states warts have improved. Patient was able to get the HPV vaccine and is due for the second injection in 2 months.     The patient has no other concerns today.    Review of Systems:     Unremarkable other than mentioned above.     Physical Exam:     General: Relaxed, oriented, alert    Skin examination of the scalp, face, neck, chest, back, abdomen, upper extremities and lower extremities were normal except for as listed below      Assessment and Plan:     1.  Other Viral Wart  - verrucous papules with thrombosed capillary loops located on the upper and lower lips     Plan:    Liquid Nitrogen  A total of 8 lesion(s) was treated with liquid nitrogen, located on the above listed location.  This procedure was medically necessary because the lesions that were treated were: enlarging, inflamed, and contagious. The patient's consent was obtained including but not limited to risks of crusting, scabbing, blistering, scarring, darker or lighter pigmentary change, recurrence, incomplete removal and infection.     Counseling  Viral nature was discussed, and the risk of the warts spreading   Verruca Vulgaris can be treated with retinoids, aldara, salicylic acid preparations, cryotherapy, candida antigen, or cantharidin  HPV vaccination is recommended   Over the counter Wart Stick can be applied daily as an adjunct therapy (wait until blister heals from cryotherapy)      continue aldara   -continue cidofovir cream   - avoid shaving in these area to  prevent spread     Ghanshyam Egan MD   Mohs Surgery/Dermatologic Oncology  Dermatology

## 2025-07-29 ENCOUNTER — OFFICE VISIT (OUTPATIENT)
Dept: DERMATOLOGY | Facility: CLINIC | Age: 65
End: 2025-07-29
Payer: COMMERCIAL

## 2025-07-29 DIAGNOSIS — B07.8 OTHER VIRAL WARTS: Primary | ICD-10-CM

## 2025-07-29 DIAGNOSIS — Z85.828 HISTORY OF NONMELANOMA SKIN CANCER: ICD-10-CM

## 2025-07-29 PROCEDURE — 99213 OFFICE O/P EST LOW 20 MIN: CPT | Mod: 25,,, | Performed by: STUDENT IN AN ORGANIZED HEALTH CARE EDUCATION/TRAINING PROGRAM

## 2025-07-29 PROCEDURE — 1159F MED LIST DOCD IN RCRD: CPT | Mod: ,,, | Performed by: STUDENT IN AN ORGANIZED HEALTH CARE EDUCATION/TRAINING PROGRAM

## 2025-07-29 PROCEDURE — 17110 DESTRUCTION B9 LES UP TO 14: CPT | Mod: ,,, | Performed by: STUDENT IN AN ORGANIZED HEALTH CARE EDUCATION/TRAINING PROGRAM

## 2025-07-29 NOTE — PROGRESS NOTES
[facility]  Ghanhsyam Egan MD    [address] [place] [address]  [phone]    Fax: [phone]    Patient Name: Robert Serrano  Medical Record Number: [id number]  PCP: Nic Navarro MD  Age: 65 y.o. : 1960  Contact: [phone] (home)     History of Present Illness:     Robert Serrano is a 65 y.o.  male here for follow up of perioral Warts previously treated with LN2, aldara, and cidofovir cream. Patient states warts have improved. Patient was able to get the HPV vaccine and is due for the 3rd injection in 2025.     The patient has no other concerns today.    Review of Systems:     Unremarkable other than mentioned above.     Physical Exam:     General: Relaxed, oriented, alert    Skin examination of the scalp, face, neck, chest, back, abdomen, upper extremities and lower extremities were normal except for as listed below      Assessment and Plan:     1.  Other Viral Wart  - verrucous papules with thrombosed capillary loops located on the lower lips     Plan:    Liquid Nitrogen  A total of 5 lesion(s) was treated with liquid nitrogen, located on the above listed location.  This procedure was medically necessary because the lesions that were treated were: enlarging, inflamed, and contagious. The patient's consent was obtained including but not limited to risks of crusting, scabbing, blistering, scarring, darker or lighter pigmentary change, recurrence, incomplete removal and infection.     Counseling  Viral nature was discussed, and the risk of the warts spreading   Verruca Vulgaris can be treated with retinoids, aldara, salicylic acid preparations, cryotherapy, candida antigen, or cantharidin  HPV vaccination is recommended   Over the counter Wart Stick can be applied daily as an adjunct therapy (wait until blister heals from cryotherapy)      continue aldara   -continue cidofovir cream   - avoid shaving in these area to prevent  spread     2. History of NMSC   Well-healed scar on lip  No e/o recurrence   Recommend sunscreen and good photoprotection     Ghanshyam Egan MD   Mohs Surgery/Dermatologic Oncology  Dermatology